# Patient Record
Sex: MALE | Race: WHITE | NOT HISPANIC OR LATINO
[De-identification: names, ages, dates, MRNs, and addresses within clinical notes are randomized per-mention and may not be internally consistent; named-entity substitution may affect disease eponyms.]

---

## 2017-02-03 ENCOUNTER — MESSAGE (OUTPATIENT)
Age: 62
End: 2017-02-03

## 2017-02-13 ENCOUNTER — APPOINTMENT (OUTPATIENT)
Dept: RADIATION ONCOLOGY | Facility: CLINIC | Age: 62
End: 2017-02-13

## 2017-02-13 DIAGNOSIS — Z92.3 PERSONAL HISTORY OF IRRADIATION: ICD-10-CM

## 2017-02-13 RX ORDER — AMLODIPINE BESYLATE 10 MG/1
10 TABLET ORAL
Refills: 0 | Status: ACTIVE | COMMUNITY

## 2017-09-19 ENCOUNTER — APPOINTMENT (OUTPATIENT)
Dept: OTOLARYNGOLOGY | Facility: CLINIC | Age: 62
End: 2017-09-19
Payer: COMMERCIAL

## 2017-09-19 VITALS
HEART RATE: 69 BPM | SYSTOLIC BLOOD PRESSURE: 172 MMHG | BODY MASS INDEX: 33.6 KG/M2 | WEIGHT: 240 LBS | DIASTOLIC BLOOD PRESSURE: 80 MMHG | HEIGHT: 71 IN

## 2017-09-19 PROCEDURE — 99214 OFFICE O/P EST MOD 30 MIN: CPT | Mod: 25

## 2017-09-19 PROCEDURE — 31575 DIAGNOSTIC LARYNGOSCOPY: CPT

## 2017-11-28 ENCOUNTER — OUTPATIENT (OUTPATIENT)
Dept: OUTPATIENT SERVICES | Facility: HOSPITAL | Age: 62
LOS: 1 days | End: 2017-11-28
Payer: COMMERCIAL

## 2017-11-28 VITALS
RESPIRATION RATE: 16 BRPM | HEART RATE: 64 BPM | TEMPERATURE: 98 F | WEIGHT: 240.3 LBS | SYSTOLIC BLOOD PRESSURE: 155 MMHG | DIASTOLIC BLOOD PRESSURE: 79 MMHG | HEIGHT: 71 IN

## 2017-11-28 DIAGNOSIS — E78.5 HYPERLIPIDEMIA, UNSPECIFIED: ICD-10-CM

## 2017-11-28 DIAGNOSIS — Z01.818 ENCOUNTER FOR OTHER PREPROCEDURAL EXAMINATION: ICD-10-CM

## 2017-11-28 DIAGNOSIS — I71.4 ABDOMINAL AORTIC ANEURYSM, WITHOUT RUPTURE: Chronic | ICD-10-CM

## 2017-11-28 DIAGNOSIS — I70.213 ATHEROSCLEROSIS OF NATIVE ARTERIES OF EXTREMITIES WITH INTERMITTENT CLAUDICATION, BILATERAL LEGS: ICD-10-CM

## 2017-11-28 DIAGNOSIS — I10 ESSENTIAL (PRIMARY) HYPERTENSION: ICD-10-CM

## 2017-11-28 DIAGNOSIS — Z98.890 OTHER SPECIFIED POSTPROCEDURAL STATES: Chronic | ICD-10-CM

## 2017-11-28 LAB
ANION GAP SERPL CALC-SCNC: 12 MMOL/L — SIGNIFICANT CHANGE UP (ref 5–17)
APTT BLD: 31.2 SEC — SIGNIFICANT CHANGE UP (ref 27.5–37.4)
BLD GP AB SCN SERPL QL: SIGNIFICANT CHANGE UP
BUN SERPL-MCNC: 17 MG/DL — SIGNIFICANT CHANGE UP (ref 8–20)
CALCIUM SERPL-MCNC: 9.7 MG/DL — SIGNIFICANT CHANGE UP (ref 8.6–10.2)
CHLORIDE SERPL-SCNC: 102 MMOL/L — SIGNIFICANT CHANGE UP (ref 98–107)
CO2 SERPL-SCNC: 27 MMOL/L — SIGNIFICANT CHANGE UP (ref 22–29)
CREAT SERPL-MCNC: 1.08 MG/DL — SIGNIFICANT CHANGE UP (ref 0.5–1.3)
GLUCOSE SERPL-MCNC: 118 MG/DL — HIGH (ref 70–115)
HCT VFR BLD CALC: 40.1 % — LOW (ref 42–52)
HGB BLD-MCNC: 13.1 G/DL — LOW (ref 14–18)
INR BLD: 0.91 RATIO — SIGNIFICANT CHANGE UP (ref 0.88–1.16)
MCHC RBC-ENTMCNC: 30.8 PG — SIGNIFICANT CHANGE UP (ref 27–31)
MCHC RBC-ENTMCNC: 32.7 G/DL — SIGNIFICANT CHANGE UP (ref 32–36)
MCV RBC AUTO: 94.1 FL — HIGH (ref 80–94)
PLATELET # BLD AUTO: 291 K/UL — SIGNIFICANT CHANGE UP (ref 150–400)
POTASSIUM SERPL-MCNC: 5 MMOL/L — SIGNIFICANT CHANGE UP (ref 3.5–5.3)
POTASSIUM SERPL-SCNC: 5 MMOL/L — SIGNIFICANT CHANGE UP (ref 3.5–5.3)
PROTHROM AB SERPL-ACNC: 10 SEC — SIGNIFICANT CHANGE UP (ref 9.8–12.7)
RBC # BLD: 4.26 M/UL — LOW (ref 4.6–6.2)
RBC # FLD: 14.2 % — SIGNIFICANT CHANGE UP (ref 11–15.6)
SODIUM SERPL-SCNC: 141 MMOL/L — SIGNIFICANT CHANGE UP (ref 135–145)
TYPE + AB SCN PNL BLD: SIGNIFICANT CHANGE UP
WBC # BLD: 8.3 K/UL — SIGNIFICANT CHANGE UP (ref 4.8–10.8)
WBC # FLD AUTO: 8.3 K/UL — SIGNIFICANT CHANGE UP (ref 4.8–10.8)

## 2017-11-28 PROCEDURE — 93010 ELECTROCARDIOGRAM REPORT: CPT

## 2017-11-28 RX ORDER — CEFAZOLIN SODIUM 1 G
2000 VIAL (EA) INJECTION ONCE
Qty: 0 | Refills: 0 | Status: DISCONTINUED | OUTPATIENT
Start: 2017-12-12 | End: 2017-12-27

## 2017-11-28 RX ORDER — SODIUM CHLORIDE 9 MG/ML
3 INJECTION INTRAMUSCULAR; INTRAVENOUS; SUBCUTANEOUS ONCE
Qty: 0 | Refills: 0 | Status: DISCONTINUED | OUTPATIENT
Start: 2017-12-12 | End: 2017-12-27

## 2017-11-28 NOTE — H&P PST ADULT - PSH
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

## 2017-11-28 NOTE — H&P PST ADULT - PMH
Abdominal aortic aneurysm  Followed by Vascular surgeon -- Dr Sequeira  DM (diabetes mellitus)  Pre -diabetic ( controlled with diet & exercise)  HTN (hypertension)    Hyperlipemia    Larynx cancer  Radiationm Therapy 2010  Obesity (BMI 30.0-34.9)    PVD (peripheral vascular disease)

## 2017-11-28 NOTE — H&P PST ADULT - HISTORY OF PRESENT ILLNESS
62 year old male who states that he has difficulty and pain while walking for the last 5 years which got progressively worse,

## 2017-11-28 NOTE — H&P PST ADULT - ASSESSMENT
medications reviewed, instructions given on what medications to take and what not to take. Asked the patient to take the Blood pressure medication/ heart medication on DOP. medications reviewed, instructions given on what medications to take and what not to take. Asked the patient to take the Blood pressure medication/ heart medication on DOP.   patient has a history of laryngeal cancer and had radiation to his neck, but he had surgeries after that with no problems, case discussed with Dr. willis

## 2017-11-28 NOTE — H&P PST ADULT - PROBLEM SELECTOR PLAN 1
Open left brachial access aortogram with pelvic angiogram possible internal iliac angioplasty. Medical and cardiac clearance pending

## 2017-11-28 NOTE — H&P PST ADULT - NSANTHOSAYNRD_GEN_A_CORE
No. AIDEE screening performed.  STOP BANG Legend: 0-2 = LOW Risk; 3-4 = INTERMEDIATE Risk; 5-8 = HIGH Risk

## 2017-12-12 ENCOUNTER — OUTPATIENT (OUTPATIENT)
Dept: OUTPATIENT SERVICES | Facility: HOSPITAL | Age: 62
LOS: 1 days | End: 2017-12-12
Payer: COMMERCIAL

## 2017-12-12 VITALS
DIASTOLIC BLOOD PRESSURE: 64 MMHG | WEIGHT: 231.93 LBS | OXYGEN SATURATION: 100 % | HEART RATE: 64 BPM | HEIGHT: 71 IN | SYSTOLIC BLOOD PRESSURE: 142 MMHG | TEMPERATURE: 97 F | RESPIRATION RATE: 16 BRPM

## 2017-12-12 VITALS
DIASTOLIC BLOOD PRESSURE: 87 MMHG | RESPIRATION RATE: 16 BRPM | OXYGEN SATURATION: 99 % | HEART RATE: 62 BPM | SYSTOLIC BLOOD PRESSURE: 146 MMHG | TEMPERATURE: 98 F

## 2017-12-12 DIAGNOSIS — Z01.818 ENCOUNTER FOR OTHER PREPROCEDURAL EXAMINATION: ICD-10-CM

## 2017-12-12 DIAGNOSIS — I70.213 ATHEROSCLEROSIS OF NATIVE ARTERIES OF EXTREMITIES WITH INTERMITTENT CLAUDICATION, BILATERAL LEGS: ICD-10-CM

## 2017-12-12 DIAGNOSIS — Z98.890 OTHER SPECIFIED POSTPROCEDURAL STATES: Chronic | ICD-10-CM

## 2017-12-12 DIAGNOSIS — I71.4 ABDOMINAL AORTIC ANEURYSM, WITHOUT RUPTURE: Chronic | ICD-10-CM

## 2017-12-12 PROCEDURE — 93005 ELECTROCARDIOGRAM TRACING: CPT

## 2017-12-12 PROCEDURE — C1894: CPT

## 2017-12-12 PROCEDURE — 85027 COMPLETE CBC AUTOMATED: CPT

## 2017-12-12 PROCEDURE — 85730 THROMBOPLASTIN TIME PARTIAL: CPT

## 2017-12-12 PROCEDURE — G0463: CPT

## 2017-12-12 PROCEDURE — 37220: CPT

## 2017-12-12 PROCEDURE — 36415 COLL VENOUS BLD VENIPUNCTURE: CPT

## 2017-12-12 PROCEDURE — 86901 BLOOD TYPING SEROLOGIC RH(D): CPT

## 2017-12-12 PROCEDURE — 86850 RBC ANTIBODY SCREEN: CPT

## 2017-12-12 PROCEDURE — C1725: CPT

## 2017-12-12 PROCEDURE — 86900 BLOOD TYPING SEROLOGIC ABO: CPT

## 2017-12-12 PROCEDURE — 85610 PROTHROMBIN TIME: CPT

## 2017-12-12 PROCEDURE — 76000 FLUOROSCOPY <1 HR PHYS/QHP: CPT

## 2017-12-12 PROCEDURE — 80048 BASIC METABOLIC PNL TOTAL CA: CPT

## 2017-12-12 RX ORDER — ATORVASTATIN CALCIUM 80 MG/1
1 TABLET, FILM COATED ORAL
Qty: 0 | Refills: 0 | COMMUNITY

## 2017-12-12 RX ORDER — SODIUM CHLORIDE 9 MG/ML
1000 INJECTION, SOLUTION INTRAVENOUS
Qty: 0 | Refills: 0 | Status: DISCONTINUED | OUTPATIENT
Start: 2017-12-12 | End: 2017-12-12

## 2017-12-12 RX ORDER — ONDANSETRON 8 MG/1
4 TABLET, FILM COATED ORAL ONCE
Qty: 0 | Refills: 0 | Status: DISCONTINUED | OUTPATIENT
Start: 2017-12-12 | End: 2017-12-12

## 2017-12-12 RX ORDER — FENTANYL CITRATE 50 UG/ML
25 INJECTION INTRAVENOUS
Qty: 0 | Refills: 0 | Status: DISCONTINUED | OUTPATIENT
Start: 2017-12-12 | End: 2017-12-12

## 2017-12-12 RX ORDER — ASPIRIN/CALCIUM CARB/MAGNESIUM 324 MG
1 TABLET ORAL
Qty: 0 | Refills: 0 | COMMUNITY

## 2017-12-12 NOTE — BRIEF OPERATIVE NOTE - OPERATION/FINDINGS
U/s guided left femoral access, aortogram with pelvic angiogram, left external iliac angioplasty ( 7 and 8 mm balloon), left leg angiogram

## 2017-12-12 NOTE — ASU DISCHARGE PLAN (ADULT/PEDIATRIC). - MEDICATION SUMMARY - MEDICATIONS TO TAKE
I will START or STAY ON the medications listed below when I get home from the hospital:    aspirin 81 mg oral tablet  -- 1 tab(s) by mouth once a day  -- Indication: For Atherosclerosis of native artery of both lower extremities with intermittent claudication    Diovan 160 mg oral tablet  -- 1 tab(s) by mouth once a day  -- Indication: For Atherosclerosis of native artery of both lower extremities with intermittent claudication    atorvastatin 40 mg oral tablet  -- 1 tab(s) by mouth once a day  -- Indication: For Atherosclerosis of native artery of both lower extremities with intermittent claudication    amLODIPine 10 mg oral tablet  -- 1 tab(s) by mouth once a day  -- Indication: For Atherosclerosis of native artery of both lower extremities with intermittent claudication    Vitamin B Compound Strong  -- 1   once a day  -- Indication: For Atherosclerosis of native artery of both lower extremities with intermittent claudication    multivitamin  -- 1   once a day  -- Indication: For Atherosclerosis of native artery of both lower extremities with intermittent claudication

## 2017-12-12 NOTE — BRIEF OPERATIVE NOTE - PROCEDURE
<<-----Click on this checkbox to enter Procedure Iliac artery angioplasty  12/12/2017  Left external with 7 and 8 mm balloon  Active  [id number]  Angiogram, abdominal aorta, fluoroscopic, with runoff  12/12/2017    [id number]

## 2017-12-13 ENCOUNTER — TRANSCRIPTION ENCOUNTER (OUTPATIENT)
Age: 62
End: 2017-12-13

## 2018-02-07 ENCOUNTER — TRANSCRIPTION ENCOUNTER (OUTPATIENT)
Age: 63
End: 2018-02-07

## 2018-09-25 ENCOUNTER — APPOINTMENT (OUTPATIENT)
Dept: OTOLARYNGOLOGY | Facility: CLINIC | Age: 63
End: 2018-09-25
Payer: MEDICARE

## 2018-09-25 VITALS
HEIGHT: 71 IN | HEART RATE: 96 BPM | BODY MASS INDEX: 32.48 KG/M2 | DIASTOLIC BLOOD PRESSURE: 83 MMHG | SYSTOLIC BLOOD PRESSURE: 158 MMHG | WEIGHT: 232 LBS

## 2018-09-25 DIAGNOSIS — Z85.21 PERSONAL HISTORY OF MALIGNANT NEOPLASM OF LARYNX: ICD-10-CM

## 2018-09-25 DIAGNOSIS — H60.90 UNSPECIFIED OTITIS EXTERNA, UNSPECIFIED EAR: ICD-10-CM

## 2018-09-25 PROCEDURE — 31575 DIAGNOSTIC LARYNGOSCOPY: CPT

## 2018-09-25 PROCEDURE — 99214 OFFICE O/P EST MOD 30 MIN: CPT | Mod: 25

## 2018-09-25 RX ORDER — ATORVASTATIN CALCIUM 80 MG/1
80 TABLET, FILM COATED ORAL
Refills: 0 | Status: ACTIVE | COMMUNITY

## 2018-09-25 RX ORDER — OFLOXACIN OTIC 3 MG/ML
0.3 SOLUTION AURICULAR (OTIC)
Qty: 1 | Refills: 0 | Status: ACTIVE | COMMUNITY
Start: 2018-09-25 | End: 1900-01-01

## 2018-09-25 RX ORDER — LECITHIN 1200 MG
CAPSULE ORAL
Refills: 0 | Status: ACTIVE | COMMUNITY

## 2019-09-23 ENCOUNTER — APPOINTMENT (OUTPATIENT)
Dept: OTOLARYNGOLOGY | Facility: CLINIC | Age: 64
End: 2019-09-23

## 2020-11-24 NOTE — H&P PST ADULT - ENMT
negative No oral lesions; no gross abnormalities Erythromycin Counseling:  I discussed with the patient the risks of erythromycin including but not limited to GI upset, allergic reaction, drug rash, diarrhea, increase in liver enzymes, and yeast infections.

## 2023-03-22 NOTE — H&P PST ADULT - CIGARETTES, NUMBER OF YRS
MATTHEWM to call about his appt on 4/4/23  Left [address] office info to call back  [facility] not take [facility] for [facility] on surgical will need to be [facility] or [facility]  38